# Patient Record
Sex: FEMALE | Race: WHITE | URBAN - METROPOLITAN AREA
[De-identification: names, ages, dates, MRNs, and addresses within clinical notes are randomized per-mention and may not be internally consistent; named-entity substitution may affect disease eponyms.]

---

## 2017-09-20 ENCOUNTER — TELEPHONE (OUTPATIENT)
Dept: PODIATRY | Facility: CLINIC | Age: 67
End: 2017-09-20

## 2017-09-20 ENCOUNTER — OFFICE VISIT (OUTPATIENT)
Dept: PODIATRY | Facility: CLINIC | Age: 67
End: 2017-09-20
Payer: COMMERCIAL

## 2017-09-20 VITALS
DIASTOLIC BLOOD PRESSURE: 70 MMHG | HEIGHT: 64 IN | BODY MASS INDEX: 34.15 KG/M2 | WEIGHT: 200 LBS | SYSTOLIC BLOOD PRESSURE: 120 MMHG

## 2017-09-20 DIAGNOSIS — E11.9 TYPE 2 DIABETES MELLITUS WITHOUT COMPLICATION, WITHOUT LONG-TERM CURRENT USE OF INSULIN (H): Primary | ICD-10-CM

## 2017-09-20 DIAGNOSIS — B35.1 DERMATOPHYTOSIS OF NAIL: ICD-10-CM

## 2017-09-20 PROBLEM — M85.80 OSTEOPENIA: Status: ACTIVE | Noted: 2017-09-20

## 2017-09-20 PROBLEM — S06.9XAA TRAUMATIC BRAIN INJURY (H): Status: ACTIVE | Noted: 2017-09-20

## 2017-09-20 PROBLEM — F09 COGNITIVE DISORDER: Status: ACTIVE | Noted: 2017-09-20

## 2017-09-20 PROBLEM — I10 HYPERTENSION: Status: ACTIVE | Noted: 2017-09-20

## 2017-09-20 PROBLEM — K59.2 NEUROGENIC BOWEL: Status: ACTIVE | Noted: 2017-09-20

## 2017-09-20 PROBLEM — J45.909 ASTHMA: Status: ACTIVE | Noted: 2017-09-20

## 2017-09-20 PROBLEM — E03.9 HYPOTHYROIDISM: Status: ACTIVE | Noted: 2017-09-20

## 2017-09-20 PROCEDURE — 11720 DEBRIDE NAIL 1-5: CPT | Performed by: PODIATRIST

## 2017-09-20 PROCEDURE — 99203 OFFICE O/P NEW LOW 30 MIN: CPT | Mod: 25 | Performed by: PODIATRIST

## 2017-09-20 RX ORDER — LEVOTHYROXINE SODIUM 100 UG/1
100 TABLET ORAL
COMMUNITY
Start: 2017-06-07

## 2017-09-20 RX ORDER — IPRATROPIUM BROMIDE AND ALBUTEROL SULFATE 2.5; .5 MG/3ML; MG/3ML
3 SOLUTION RESPIRATORY (INHALATION)
COMMUNITY
Start: 2017-04-26

## 2017-09-20 RX ORDER — METOPROLOL TARTRATE 50 MG
50 TABLET ORAL
COMMUNITY
Start: 2017-06-07

## 2017-09-20 RX ORDER — AZELASTINE HCL 205.5 UG/1
SPRAY NASAL
COMMUNITY
Start: 2017-02-15

## 2017-09-20 RX ORDER — BLOOD-GLUCOSE METER
KIT MISCELLANEOUS
COMMUNITY
Start: 2017-05-10

## 2017-09-20 RX ORDER — MONTELUKAST SODIUM 10 MG/1
10 TABLET ORAL
COMMUNITY
Start: 2017-06-07

## 2017-09-20 RX ORDER — GUAIFENESIN 600 MG/1
600 TABLET, EXTENDED RELEASE ORAL
COMMUNITY
Start: 2017-02-15

## 2017-09-20 RX ORDER — BUPROPION HYDROCHLORIDE 75 MG/1
0.5 TABLET ORAL
COMMUNITY

## 2017-09-20 RX ORDER — ATORVASTATIN CALCIUM 20 MG/1
20 TABLET, FILM COATED ORAL
COMMUNITY
Start: 2017-06-07

## 2017-09-20 RX ORDER — DIPHENOXYLATE HYDROCHLORIDE AND ATROPINE SULFATE 2.5; .025 MG/1; MG/1
1 TABLET ORAL
COMMUNITY
Start: 2013-01-10

## 2017-09-20 RX ORDER — CETIRIZINE HYDROCHLORIDE 10 MG/1
10 TABLET ORAL
COMMUNITY
Start: 2013-01-10

## 2017-09-20 RX ORDER — HYDROCHLOROTHIAZIDE 12.5 MG/1
12.5 TABLET ORAL
COMMUNITY
Start: 2017-06-07

## 2017-09-20 RX ORDER — ARIPIPRAZOLE 2 MG/1
2 TABLET ORAL
COMMUNITY
Start: 2017-07-20

## 2017-09-20 RX ORDER — METFORMIN HCL 500 MG
TABLET, EXTENDED RELEASE 24 HR ORAL
COMMUNITY
Start: 2017-04-26

## 2017-09-20 RX ORDER — ACETAMINOPHEN 325 MG/1
2 TABLET ORAL
COMMUNITY

## 2017-09-20 NOTE — TELEPHONE ENCOUNTER
Form completed for: Lise Foster  What was done with form: Mail form to:  Lise.  Street Address:  [address].  City:  [place], State:  [place] Zip Code:  [address]     Katie Frederick MA

## 2017-09-20 NOTE — TELEPHONE ENCOUNTER
Liane is calling stating was in with patient and gave RN/MA forms-medical forms for provider to fill out but never got it back when leaving clinic and would like those forms filled out and sent to patients address on file. Please call to advise if forms were received and when she can expect them back. Thank you.

## 2017-09-20 NOTE — PATIENT INSTRUCTIONS
We wish you continued good healing. If you have any questions or concerns, please do not hesitate to contact us at [phone]      Please remember to call and schedule a follow up appointment if one was recommended at your earliest convenience.   PODIATRY CLINIC HOURS  TELEPHONE NUMBER    Dr. Ivan Chi D.P.M [facility]    Clinics:  [facility]        Katie Frederick MA  Medical Assistant  Tuesday 1PM-6PM  [facility]  Wednesday 7AM-2PM  [place]  Thursday 10AM-6PM  [place] Friday 7AM-345PM  [place]  Specialty schedulers:   [phone] to make an appointment with any Specialty Provider.        Urgent Care locations:    [facility] Monday-Friday 5 pm - 9 pm. Saturday-Sunday 9 am -5pm    Monday-Friday 11 am - 9 pm Saturday 9 am - 5 pm     Monday-Sunday 12 noon-8PM [phone]     If you need a medication refill, please contact us you may need lab work and/or a follow up visit prior to your refill (i.e. Antifungal medications).    [employer] (secure e-mail communication and access to your chart) to send a message or to make an appointment.    If MRI needed please call Yogesh Jessica at [phone]        Weight management plan: Patient was referred to their PCP to discuss a diet and exercise plan.

## 2017-09-20 NOTE — MR AVS SNAPSHOT
After Visit Summary   9/20/2017    Lise Foster    MRN: [id number]           Patient Information     Date Of Birth          1950        Visit Information        Provider Department      9/20/2017 10:00 AM Ivan Chi, JASMINE [facility]        Care Instructions    We wish you continued good healing. If you have any questions or concerns, please do not hesitate to contact us at [phone]      Please remember to call and schedule a follow up appointment if one was recommended at your earliest convenience.   PODIATRY CLINIC HOURS  TELEPHONE NUMBER    Dr. Ivan MEEKPKRISTOFER FAC FAS    Clinics:  [facility]        Katie Frederick MA  Medical Assistant  Tuesday 1PM-6PM  [facility]  Wednesday 7AM-2PM  [place]  Thursday 10AM-6PM  [place]  Friday 7AM-345PM  [place]  Specialty schedulers:   [phone] to make an appointment with any Specialty Provider.        Urgent Care locations:    [facility] Monday-Friday 5 pm - 9 pm. Saturday-Sunday 9 am -5pm    Monday-Friday 11 am - 9 pm Saturday 9 am - 5 pm     Monday-Sunday 12 noon-8PM [phone]     If you need a medication refill, please contact us you may need lab work and/or a follow up visit prior to your refill (i.e. Antifungal medications).    [employer] (secure e-mail communication and access to your chart) to send a message or to make an appointment.    If MRI needed please call Yogesh Jessica at [phone]        Weight management plan: Patient was referred to their PCP to discuss a diet and exercise plan.            Follow-ups after your visit        Who to contact     If you have questions or need follow up information about today's clinic visit or your schedule please contact [facility] directly at [phone].  Normal or non-critical lab and imaging results will be  "communicated to you by MyChart, letter or phone within 4 business days after the clinic has received the results. If you do not hear from us within 7 days, please contact the clinic through [employer] or phone. If you have a critical or abnormal lab result, we will notify you by phone as soon as possible.  Submit refill requests through [facility] or call your pharmacy and they will forward the refill request to us. Please allow 3 business days for your refill to be completed.          Additional Information About Your Visit        [facility] Information     [facility] lets you send messages to your doctor, view your test results, renew your prescriptions, schedule appointments and more. To sign up, go to [email or web address] . Click on \"Log in\" on the left side of the screen, which will take you to the Welcome page. Then click on \"Sign up Now\" on the right side of the page.     You will be asked to enter the access code listed below, as well as some personal information. Please follow the directions to create your username and password.     Your access code is: [email or web address]  Expires: 2017 10:11 AM     Your access code will  in 90 days. If you need help or a new code, please call your [place] clinic or [phone].        Care EveryWhere ID     This is your Care EveryWhere ID. This could be used by other organizations to access your [place] medical records  [id number]        Your Vitals Were     Height BMI (Body Mass Index)                1.626 m (5' 4\") 34.33 kg/m2           Blood Pressure from Last 3 Encounters:   17 120/70    Weight from Last 3 Encounters:   17 90.7 kg (200 lb)              Today, you had the following     No orders found for display       Primary Care Provider    None Specified       No primary provider on file.        Equal Access to Services     ANDRÉS FROST : Antoinette Perez, adriel freeman, sheldon solomon " ah. So [facility] [phone].    ATENCIÓN: Si gadiel pike, tiene a valles disposición servicios gratuitos de asistencia lingüística. Roma menon [phone].    We comply with applicable federal civil rights laws and [place] laws. We do not discriminate on the basis of race, color, national origin, age, disability sex, sexual orientation or gender identity.            Thank you!     Thank you for choosing [facility]  [facility]. Our goal is always to provide you with excellent care. Hearing back from our patients is one way we can continue to improve our services. Please take a few minutes to complete the written survey that you may receive in the mail after your visit with us. Thank you!             Your Updated Medication List - Protect others around you: Learn how to safely use, store and throw away your medicines at www.disposemymeds.org.          This list is accurate as of: 9/20/17 10:11 AM.  Always use your most recent med list.                   Brand Name Dispense Instructions for use Diagnosis    acetaminophen 325 MG tablet    TYLENOL     Take 2 tablets by mouth        ADVAIR DISKUS 100-50 MCG/DOSE diskus inhaler   Generic drug:  fluticasone-salmeterol      Inhale 1 puff into the lungs        ARIPiprazole 2 MG tablet    ABILIFY     Take 2 mg by mouth        atorvastatin 20 MG tablet    LIPITOR     Take 20 mg by mouth        Azelastine HCl 0.15 % Soln           B-D ULTRA-FINE 33 LANCETS Misc      As directed. Dispense item covered by pt ins. E11.65 NIDDM type II, uncontrolled - Test 2 times/day. Reason: Med change        buPROPion 75 MG tablet    WELLBUTRIN     Take 0.5 tablets by mouth        calcium 500/D 500-200 MG-UNIT per tablet   Generic drug:  calcium carb 1250 mg (500 mg [place])/vitamin D 200 unit      Take 1 tablet by mouth        cetirizine 10 MG tablet    zyrTEC     Take 10 mg by mouth        guaiFENesin 600 MG 12 hr tablet    MUCINEX     Take 600 mg by mouth        hydrochlorothiazide  12.5 MG Tabs tablet      Take 12.5 mg by mouth        ipratropium - albuterol 0.5 mg/2.5 mg/3 mL 0.5-2.5 (3) MG/3ML neb solution    DUONEB     Inhale 3 mLs into the lungs        levothyroxine 100 MCG tablet    SYNTHROID/LEVOTHROID     Take 100 mcg by mouth        metFORMIN 500 MG 24 hr tablet    GLUCOPHAGE-XR     500 mg ER 1 pill daily for 3 days then increase to 2 pills daily with food        metoprolol 50 MG tablet    LOPRESSOR     Take 50 mg by mouth        montelukast 10 MG tablet    SINGULAIR     Take 10 mg by mouth        MULTI-VITAMINS Tabs      Take 1 tablet by mouth        omeprazole 20 MG CR capsule    priLOSEC     Take 20 mg by mouth        ONE TOUCH VERIO IQ test strip   Generic drug:  blood glucose monitoring      Dispense item covered by pt ins. E11.65 NIDDM type II, uncontrolled - Test 2 times/day. Reason: Med change        vitamin D3 1000 UNITS Caps      Take 1,000 Units by mouth

## 2017-09-20 NOTE — NURSING NOTE
"Chief Complaint   Patient presents with     Diabetes     Toenail       Initial /70 (BP Location: Left arm)  Ht 1.626 m (5' 4\")  Wt 90.7 kg (200 lb)  BMI 34.33 kg/m2 Estimated body mass index is 34.33 kg/(m^2) as calculated from the following:    Height as of this encounter: 1.626 m (5' 4\").    Weight as of this encounter: 90.7 kg (200 lb).  Medication Reconciliation: complete  "

## 2017-09-20 NOTE — PROGRESS NOTES
Subjective:    Pt is seen today as a new pt for a diabetic foot exam.  Patient is having a hard time trimming his nails and points to all of them.  This has been getting more difficult over the years.  Sometimes they are painful.  The pain is aggravated by activity and relieved by rest.  Patient recently diagnosed with diabetes.  No hisory of foot ulcers or numbness.      ROS:  No hx of wound healing problems, denies numbness, erythema, or fever and chills.  Denies foot ulcers.     Not on File    Current Outpatient Prescriptions   Medication Sig Dispense Refill     Multiple Vitamin (MULTI-VITAMINS) TABS Take 1 tablet by mouth       calcium carb 1250 mg, 500 mg [place],/vitamin D 200 unit (CALCIUM 500/D) 500-200 MG-UNIT per tablet Take 1 tablet by mouth       cetirizine (ZYRTEC) 10 MG tablet Take 10 mg by mouth       buPROPion (WELLBUTRIN) 75 MG tablet Take 0.5 tablets by mouth       acetaminophen (TYLENOL) 325 MG tablet Take 2 tablets by mouth       guaiFENesin (MUCINEX) 600 MG 12 hr tablet Take 600 mg by mouth       Azelastine HCl 0.15 % SOLN        Cholecalciferol (VITAMIN D3) 1000 UNITS CAPS Take 1,000 Units by mouth       metFORMIN (GLUCOPHAGE-XR) 500 MG 24 hr tablet 500 mg ER 1 pill daily for 3 days then increase to 2 pills daily with food       ipratropium - albuterol 0.5 mg/2.5 mg/3 mL (DUONEB) 0.5-2.5 (3) MG/3ML neb solution Inhale 3 mLs into the lungs       B-D ULTRA-FINE 33 LANCETS MISC As directed. Dispense item covered by pt ins. E11.65 NIDDM type II, uncontrolled - Test 2 times/day. Reason: Med change       blood glucose monitoring (ONE TOUCH VERIO IQ) test strip Dispense item covered by pt ins. E11.65 NIDDM type II, uncontrolled - Test 2 times/day. Reason: Med change       atorvastatin (LIPITOR) 20 MG tablet Take 20 mg by mouth       hydrochlorothiazide 12.5 MG TABS tablet Take 12.5 mg by mouth       levothyroxine (SYNTHROID/LEVOTHROID) 100 MCG tablet Take 100 mcg by mouth       metoprolol (LOPRESSOR) 50  "MG tablet Take 50 mg by mouth       montelukast (SINGULAIR) 10 MG tablet Take 10 mg by mouth       omeprazole (PRILOSEC) 20 MG CR capsule Take 20 mg by mouth       ARIPiprazole (ABILIFY) 2 MG tablet Take 2 mg by mouth       fluticasone-salmeterol (ADVAIR DISKUS) 100-50 MCG/DOSE diskus inhaler Inhale 1 puff into the lungs         Patient Active Problem List   Diagnosis     Anxiety disorder     Asthma     Cognitive disorder     Hypertension     Hypothyroidism     Neurogenic bowel     Obesity, Class II, BMI 35-39.9, with comorbidity     SCARLET on CPAP     Osteopenia     Traumatic brain injury (H)     Type 2 diabetes mellitus without complication, without long-term current use of insulin (H)       No past medical history on file.    No past surgical history on file.    No family history on file.    Social History   Substance Use Topics     Smoking status: Never Smoker     Smokeless tobacco: Never Used     Alcohol use Not on file         Exam:    /70 (BP Location: Left arm)  Ht 1.626 m (5' 4\")  Wt 90.7 kg (200 lb)  BMI 34.33 kg/m2.  Patient a good historian.  A&O X 3.  Pulses DP 2/4, PT 2/4 b/l.  CRT < 3 seconds X 10 digits.  No ankle edema or varicosities noted.  5.07 monofilament  intact b/l.  Reflexes 2/4 b/l.  Skin healthy with hair growth noted b/l.  Normal arch with weightbearing.  No forefoot or rear foot deformities noted.  MS 5/5 all compartments.  Normal ROM all fore foot and rearfoot joints.  Both fifth nails are thickened, elongated, discolored with subungual debris.    No masses or breakdown in the skin bilaterally.  No erythema or ecchymosis noted bilateral.     A/P  Diabetes mellitus   Onychomycosis      Mycotic nails manually debrided with a .    Gave patient instructions on daily foot examination and wearing good shoes at all times.   Discussed with patient  that I do not do this in my practice unless patient at significant risk of limb loss.  Will refer to foot care service/nurse.  " Return to clinic prn.            Ivan Chi DPM, FACFAS